# Patient Record
Sex: MALE | Race: WHITE | Employment: UNEMPLOYED | URBAN - METROPOLITAN AREA
[De-identification: names, ages, dates, MRNs, and addresses within clinical notes are randomized per-mention and may not be internally consistent; named-entity substitution may affect disease eponyms.]

---

## 2022-03-19 PROBLEM — E66.01 SEVERE OBESITY (HCC): Status: ACTIVE | Noted: 2018-12-22

## 2022-08-22 ENCOUNTER — OFFICE VISIT (OUTPATIENT)
Dept: ENT CLINIC | Age: 32
End: 2022-08-22
Payer: COMMERCIAL

## 2022-08-22 VITALS — HEIGHT: 74 IN | WEIGHT: 243 LBS | BODY MASS INDEX: 31.18 KG/M2 | RESPIRATION RATE: 18 BRPM

## 2022-08-22 DIAGNOSIS — J30.9 CHRONIC ALLERGIC RHINITIS: Primary | ICD-10-CM

## 2022-08-22 PROCEDURE — 99213 OFFICE O/P EST LOW 20 MIN: CPT | Performed by: OTOLARYNGOLOGY

## 2022-08-22 RX ORDER — LAMOTRIGINE 100 MG/1
TABLET ORAL
COMMUNITY
Start: 2022-07-05

## 2022-08-22 RX ORDER — DIAZEPAM 10 MG/1
TABLET ORAL
COMMUNITY
Start: 2022-08-12

## 2022-08-22 RX ORDER — PANTOPRAZOLE SODIUM 40 MG/1
TABLET, DELAYED RELEASE ORAL
COMMUNITY
Start: 2022-08-15

## 2022-08-22 RX ORDER — IBUPROFEN 200 MG
200 TABLET ORAL EVERY 6 HOURS PRN
COMMUNITY

## 2022-08-22 RX ORDER — ESZOPICLONE 3 MG/1
TABLET, FILM COATED ORAL
COMMUNITY
Start: 2022-08-12

## 2022-08-22 RX ORDER — DIPHENOXYLATE HYDROCHLORIDE AND ATROPINE SULFATE 2.5; .025 MG/1; MG/1
TABLET ORAL
COMMUNITY
Start: 2022-07-25

## 2022-08-22 RX ORDER — MONTELUKAST SODIUM 10 MG/1
TABLET ORAL
COMMUNITY

## 2022-08-22 RX ORDER — MONTELUKAST SODIUM 10 MG/1
10 TABLET ORAL DAILY
Qty: 30 TABLET | Refills: 3 | Status: SHIPPED | OUTPATIENT
Start: 2022-08-22 | End: 2022-11-20

## 2022-08-22 RX ORDER — FLUTICASONE PROPIONATE 50 MCG
SPRAY, SUSPENSION (ML) NASAL
COMMUNITY
Start: 2021-10-14

## 2022-08-22 ASSESSMENT — ENCOUNTER SYMPTOMS
WHEEZING: 0
COUGH: 0
COLOR CHANGE: 0
EYE PAIN: 0
DIARRHEA: 0
VOMITING: 0

## 2022-08-22 NOTE — PROGRESS NOTES
Chief Complaint   Patient presents with    Medication Refill     Patient states that he is here for medication refill on his sinus medication. HPI:  Darcy Molina is a 33 y.o. male seen in follow-up for his nose. Last seen on 6/29/20. He has h/o CRS and underwent FESS a few yrs ago. He has known allergies as well and still uses Flonase, Zyrtec, and Singulair daily and as long as he takes all 3 meds, he feels pretty good. He had ran out of Zyrtec recently and had significant skin itching- doing better now that he restarted the AH. He still ahs some baseline congestion but there is no facial pain/pressure, thick nasal drainage or sinus HA. No epistaxis or nasal pain. He needs a refill on his Singulair. Past Medical History, Past Surgical History, Family history, Social History, and Medications were all reviewed with the patient today and updated as necessary. Allergies   Allergen Reactions    Tazarotene Other (See Comments)     Patient Active Problem List   Diagnosis    Anxiety    Migraine    IBS (irritable bowel syndrome)    Severe obesity (HCC)    Sleep apnea     Current Outpatient Medications   Medication Sig    diazePAM (VALIUM) 10 MG tablet TAKE 1/2 TO 1 TABLET BY MOUTH TWICE DAILY AS NEEDED FOR ANXIETY    diphenoxylate-atropine (LOMOTIL) 2.5-0.025 MG per tablet TAKE 2 TABLETS BY MOUTH FOUR TIMES DAILY AS NEEDED.  NOT TO EXCEED 8 IN 24 HOURS    eszopiclone (LUNESTA) 3 MG TABS TAKE 1 TABLET BY MOUTH EVERY NIGHT AT BEDTIME AS NEEDED    fluticasone (FLONASE) 50 MCG/ACT nasal spray by Nasal route    ibuprofen (ADVIL;MOTRIN) 200 MG tablet Take 200 mg by mouth every 6 hours as needed    lamoTRIgine (LAMICTAL) 100 MG tablet TAKE 1 TABLET BY MOUTH TWICE DAILY    montelukast (SINGULAIR) 10 MG tablet Take by mouth    pantoprazole (PROTONIX) 40 MG tablet TAKE 1 TABLET BY MOUTH EVERY DAY    montelukast (SINGULAIR) 10 MG tablet Take 1 tablet by mouth daily     No current facility-administered medications for this visit. Past Medical History:   Diagnosis Date    Anxiety     IBS (irritable bowel syndrome)     Migraine      Social History     Tobacco Use    Smoking status: Never    Smokeless tobacco: Never   Substance Use Topics    Alcohol use: No     Alcohol/week: 0.0 standard drinks     Past Surgical History:   Procedure Laterality Date    HEENT Bilateral 01/2015    FESS w/ bilateral maxillary antrostomies, anterior ethmoidectomies     No family history on file. ROS:    Review of Systems   Constitutional:  Negative for chills. Eyes:  Negative for pain. Respiratory:  Negative for cough and wheezing. Cardiovascular:  Negative for chest pain and palpitations. Gastrointestinal:  Negative for diarrhea and vomiting. Skin:  Negative for color change and wound. Allergic/Immunologic: Negative for environmental allergies. Neurological:  Negative for dizziness and headaches. PHYSICAL EXAM:    Resp 18   Ht 6' 2\" (1.88 m)   Wt 243 lb (110.2 kg)   BMI 31.20 kg/m²     General: NAD, well-appearing  Neuro: No gross neuro deficits. No facial weakness. Eyes: No periorbital edema/ecchymosis. No nystagmus. Skin: No facial erythema, rashes or concerning lesions. Nose: No external deviations or saddling. Intranasally, septum is sightly off to R side without perforations, nasal mucosa appears healthy with no erythema, mucopurulence, or polyps. Mouth: Moist mucus membranes, normal tongue/palate mobility, no concerning mucosal lesions. Oropharynx clear with no erythema/exudate, no tonsillar hypertrophy. Ears: Normal appearing auricles, no hematomas. EACs clear with no cerumen impaction, healthy canal skin, TM's intact with no perforations or retraction pockets. No middle ear effusions. Neck: Soft, supple, no palpable neck masses. No palpable parotid or submandibular masses. No thyromegaly or palpable thyroid nodules. Lymphatics: No palpable cervical LAD. Resp: No audible stridor or wheezing.   CV: No murmurs, no JVD. Extremities: No clubbing or cyanosis. ASSESSMENT and PLAN      ICD-10-CM    1. Chronic allergic rhinitis  J30.9           I refilled his Singulair today- he is on good regimen for his allergies. He may need Flonase refill last this fall and I gave him my email address if he needs another refill. RTC in 1 yr for med refill appt.      Giovany Penaloza MD  8/22/2022    Electronically signed by Giovany Penaloza MD on 8/22/2022 at 9:20 AM

## 2022-12-28 RX ORDER — MONTELUKAST SODIUM 10 MG/1
10 TABLET ORAL DAILY
Qty: 30 TABLET | Refills: 3 | Status: SHIPPED | OUTPATIENT
Start: 2022-12-28 | End: 2023-03-28

## 2023-05-02 RX ORDER — MONTELUKAST SODIUM 10 MG/1
10 TABLET ORAL DAILY
Qty: 30 TABLET | Refills: 3 | Status: SHIPPED | OUTPATIENT
Start: 2023-05-02 | End: 2023-05-04 | Stop reason: SDUPTHER

## 2023-05-04 RX ORDER — MONTELUKAST SODIUM 10 MG/1
10 TABLET ORAL DAILY
Qty: 30 TABLET | Refills: 3 | Status: SHIPPED | OUTPATIENT
Start: 2023-05-04 | End: 2023-08-02

## 2023-05-10 RX ORDER — MONTELUKAST SODIUM 10 MG/1
TABLET ORAL
Qty: 30 TABLET | Refills: 0 | Status: SHIPPED | OUTPATIENT
Start: 2023-05-10

## 2023-06-21 RX ORDER — MONTELUKAST SODIUM 10 MG/1
10 TABLET ORAL NIGHTLY
Qty: 90 TABLET | Refills: 1 | Status: SHIPPED | OUTPATIENT
Start: 2023-06-21

## 2023-06-21 NOTE — TELEPHONE ENCOUNTER
Patient called requesting a refill of his montelukast. I let the pt know that we have sent the medication in.

## 2023-08-29 RX ORDER — METHYLPREDNISOLONE 4 MG/1
TABLET ORAL
Qty: 1 KIT | Refills: 0 | Status: SHIPPED | OUTPATIENT
Start: 2023-08-29 | End: 2023-09-04

## 2023-11-06 RX ORDER — MONTELUKAST SODIUM 10 MG/1
10 TABLET ORAL DAILY
Qty: 30 TABLET | Refills: 3 | Status: SHIPPED | OUTPATIENT
Start: 2023-11-06

## 2023-11-06 NOTE — TELEPHONE ENCOUNTER
Patient called stating that he is needing a refill of his montelukast mg.  I sent the request to [address] and I have called and informed the pt of the medication and updated his pharmacy

## 2024-03-04 RX ORDER — MONTELUKAST SODIUM 10 MG/1
10 TABLET ORAL DAILY
Qty: 30 TABLET | Refills: 3 | Status: SHIPPED | OUTPATIENT
Start: 2024-03-04

## 2024-10-07 ENCOUNTER — OFFICE VISIT (OUTPATIENT)
Dept: ORTHOPEDIC SURGERY | Age: 34
End: 2024-10-07
Payer: COMMERCIAL

## 2024-10-07 DIAGNOSIS — S92.411S CLOSED DISPLACED FRACTURE OF PROXIMAL PHALANX OF RIGHT GREAT TOE, SEQUELA: Primary | ICD-10-CM

## 2024-10-07 PROCEDURE — 99204 OFFICE O/P NEW MOD 45 MIN: CPT | Performed by: ORTHOPAEDIC SURGERY
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
## 2025-03-20 RX ORDER — MONTELUKAST SODIUM 10 MG/1
10 TABLET ORAL DAILY
Qty: 30 TABLET | Refills: 3 | Status: SHIPPED | OUTPATIENT
Start: 2025-03-20

## 2025-06-05 RX ORDER — LISDEXAMFETAMINE DIMESYLATE 50 MG/1
CAPSULE ORAL
COMMUNITY
Start: 2023-12-19

## 2025-06-09 ENCOUNTER — OFFICE VISIT (OUTPATIENT)
Dept: ORTHOPEDIC SURGERY | Age: 35
End: 2025-06-09
Payer: COMMERCIAL

## 2025-06-09 DIAGNOSIS — M77.41 METATARSALGIA OF RIGHT FOOT: ICD-10-CM

## 2025-06-09 DIAGNOSIS — S92.411S CLOSED DISPLACED FRACTURE OF PROXIMAL PHALANX OF RIGHT GREAT TOE, SEQUELA: Primary | ICD-10-CM

## 2025-06-09 PROCEDURE — 99214 OFFICE O/P EST MOD 30 MIN: CPT | Performed by: ORTHOPAEDIC SURGERY

## 2025-06-09 RX ORDER — MELOXICAM 15 MG/1
15 TABLET ORAL DAILY
Qty: 30 TABLET | Refills: 3 | Status: SHIPPED | OUTPATIENT
Start: 2025-06-09
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
## 2025-06-12 ENCOUNTER — TELEPHONE (OUTPATIENT)
Dept: ORTHOPEDIC SURGERY | Age: 35
End: 2025-06-12

## 2025-06-12 DIAGNOSIS — M77.41 METATARSALGIA OF RIGHT FOOT: ICD-10-CM

## 2025-06-12 DIAGNOSIS — S92.411S CLOSED DISPLACED FRACTURE OF PROXIMAL PHALANX OF RIGHT GREAT TOE, SEQUELA: Primary | ICD-10-CM

## 2025-06-12 NOTE — TELEPHONE ENCOUNTER
He has a question about the hapads he got. I have told him to move it back in the shoe because its causing pain and numbness. He is wanting to see about getting custom insoles for his shoes. Please send an order to  if agreeable.

## 2025-07-23 ENCOUNTER — TELEPHONE (OUTPATIENT)
Dept: ORTHOPEDIC SURGERY | Age: 35
End: 2025-07-23

## 2025-07-23 NOTE — TELEPHONE ENCOUNTER
He says his PCP had ordered a NCV when he first went to him but then he saw Dr. Asif and was told to get the inserts for his shoes. His NCV is scheduled for Monday but he wants to know if he still needs it. He is thinking he doesn't since Dr. Asif knew what was wrong when he saw him.